# Patient Record
Sex: MALE | Race: WHITE | Employment: FULL TIME | URBAN - METROPOLITAN AREA
[De-identification: names, ages, dates, MRNs, and addresses within clinical notes are randomized per-mention and may not be internally consistent; named-entity substitution may affect disease eponyms.]

---

## 2018-05-26 ENCOUNTER — HOSPITAL ENCOUNTER (EMERGENCY)
Age: 48
Discharge: HOME | End: 2018-05-26
Attending: EMERGENCY MEDICINE
Payer: COMMERCIAL

## 2018-05-26 VITALS
DIASTOLIC BLOOD PRESSURE: 88 MMHG | RESPIRATION RATE: 20 BRPM | TEMPERATURE: 98 F | HEART RATE: 77 BPM | OXYGEN SATURATION: 96 % | WEIGHT: 265 LBS | HEIGHT: 69 IN | BODY MASS INDEX: 39.25 KG/M2 | SYSTOLIC BLOOD PRESSURE: 139 MMHG

## 2018-05-26 DIAGNOSIS — G51.0 BELL'S PALSY: Primary | ICD-10-CM

## 2018-05-26 PROCEDURE — 99283 EMERGENCY DEPT VISIT LOW MDM: CPT

## 2018-05-26 RX ORDER — ACYCLOVIR 400 MG/1
400 TABLET ORAL 3 TIMES DAILY
Qty: 30 TABLET | Refills: 0 | Status: SHIPPED | OUTPATIENT
Start: 2018-05-26 | End: 2018-06-05

## 2018-05-26 RX ORDER — MINERAL OIL AND PETROLATUM 150; 830 MG/G; MG/G
1 OINTMENT OPHTHALMIC 3 TIMES DAILY
Qty: 1 TUBE | Refills: 0 | Status: SHIPPED | OUTPATIENT
Start: 2018-05-26 | End: 2018-06-25

## 2018-05-26 RX ORDER — PREDNISONE 20 MG/1
60 TABLET ORAL DAILY
Qty: 15 TABLET | Refills: 0 | Status: SHIPPED | OUTPATIENT
Start: 2018-05-26 | End: 2018-05-31

## 2018-05-26 NOTE — ED INITIAL ASSESSMENT (HPI)
C/O left ear pain and headache x few days. Yesterday c/o left facial droop and watering in left eye. Denies of blurred vision/slurred speech/numbness or weakness. Drove himself coming here.

## 2018-05-26 NOTE — ED PROVIDER NOTES
Patient Seen in: [facility] In [place]    History   Patient presents with:  Bell's Palsy    Stated Complaint: C/O FACIAL DROOP/WATERING IN LEFT EYE SINCE YESTERDAY- LEFT EAR PAIN X FEW DAYS*    HPI    Patient is a 49-year-old male com distress, Well appearing and non-toxic, Alert and oriented X 3   HEENT: Normocephalic, atraumatic. Moist mucous membranes. Pupils equal round reactive to light accommodation, extraocular motion is intact, sclerae white, conjunctiva is pink.   Oropharynx i follow-up instructions were provided to help prevent relapse or worsening.   Patient was instructed to follow-up with her primary care provider for further evaluation and treatment, but to return immediately to the ER for worsening, concerning, new, changin

## (undated) NOTE — ED AVS SNAPSHOT
Sophie Mention   MRN: [id number]    Department:  [facility] in [place]   Date of Visit:  5/26/2018           Disclosure     Insurance plans vary and the physician(s) referred by the ER may not be covered by your plan.  Please cont tell this physician (or your personal doctor if your instructions are to return to your personal doctor) about any new or lasting problems. The primary care or specialist physician will see patients referred from the [facility] Emergency Department.  Rudy Brunson